# Patient Record
Sex: FEMALE | Race: WHITE | NOT HISPANIC OR LATINO | Employment: PART TIME | URBAN - METROPOLITAN AREA
[De-identification: names, ages, dates, MRNs, and addresses within clinical notes are randomized per-mention and may not be internally consistent; named-entity substitution may affect disease eponyms.]

---

## 2023-09-29 ENCOUNTER — HOSPITAL ENCOUNTER (OUTPATIENT)
Dept: DATA CONVERSION | Facility: HOSPITAL | Age: 23
Discharge: HOME | End: 2023-09-29
Payer: COMMERCIAL

## 2023-09-29 DIAGNOSIS — E55.9 VITAMIN D DEFICIENCY, UNSPECIFIED: ICD-10-CM

## 2023-09-29 DIAGNOSIS — Z00.00 ENCOUNTER FOR GENERAL ADULT MEDICAL EXAMINATION WITHOUT ABNORMAL FINDINGS: ICD-10-CM

## 2023-09-29 DIAGNOSIS — Z01.83 ENCOUNTER FOR BLOOD TYPING: ICD-10-CM

## 2023-09-29 DIAGNOSIS — K62.5 HEMORRHAGE OF ANUS AND RECTUM: ICD-10-CM

## 2023-09-29 LAB
25(OH)D3 SERPL-MCNC: 25 NG/ML (ref 31–100)
ABO + RH BLD: NORMAL
BLD GP AB SCN SERPL QL: NEGATIVE
HX OF BLOOD TRANSFUSION: NORMAL
TSH SERPL DL<=0.05 MIU/L-ACNC: 3.62 MIU/L (ref 0.27–4.2)

## 2023-11-02 PROBLEM — R53.82 CHRONIC FATIGUE: Status: ACTIVE | Noted: 2023-11-02

## 2023-11-02 PROBLEM — F41.1 ANXIETY, GENERALIZED: Status: ACTIVE | Noted: 2023-11-02

## 2023-11-02 PROBLEM — Z86.2 HISTORY OF IRON DEFICIENCY ANEMIA: Status: ACTIVE | Noted: 2023-11-02

## 2023-11-02 PROBLEM — E55.9 VITAMIN D DEFICIENCY: Status: ACTIVE | Noted: 2023-11-02

## 2023-11-02 PROBLEM — Z91.018 NUT ALLERGY: Status: ACTIVE | Noted: 2023-11-02

## 2023-11-02 PROBLEM — Z91.010 FOOD ALLERGY, PEANUT: Status: ACTIVE | Noted: 2023-11-02

## 2023-11-02 RX ORDER — DULOXETIN HYDROCHLORIDE 60 MG/1
CAPSULE, DELAYED RELEASE ORAL
COMMUNITY

## 2023-11-02 RX ORDER — PREDNISONE 20 MG/1
TABLET ORAL
COMMUNITY

## 2023-11-02 RX ORDER — DULOXETINE 40 MG/1
CAPSULE, DELAYED RELEASE ORAL
COMMUNITY

## 2023-11-02 RX ORDER — DULOXETIN HYDROCHLORIDE 20 MG/1
CAPSULE, DELAYED RELEASE ORAL
COMMUNITY

## 2023-11-02 RX ORDER — DIPHENHYDRAMINE HCL 25 MG
CAPSULE ORAL
COMMUNITY

## 2023-11-02 RX ORDER — ASPIRIN 325 MG
TABLET, DELAYED RELEASE (ENTERIC COATED) ORAL
COMMUNITY
Start: 2022-05-09

## 2023-11-02 RX ORDER — EPINEPHRINE 0.3 MG/.3ML
INJECTION SUBCUTANEOUS
COMMUNITY
Start: 2022-05-03 | End: 2023-12-19 | Stop reason: SDUPTHER

## 2023-11-02 RX ORDER — CHOLECALCIFEROL (VITAMIN D3) 125 MCG
CAPSULE ORAL
COMMUNITY
Start: 2023-09-29

## 2023-11-03 ENCOUNTER — CLINICAL SUPPORT (OUTPATIENT)
Dept: PRIMARY CARE | Facility: CLINIC | Age: 23
End: 2023-11-03
Payer: COMMERCIAL

## 2023-11-03 DIAGNOSIS — Z23 ENCOUNTER FOR IMMUNIZATION: Primary | ICD-10-CM

## 2023-11-03 PROCEDURE — 90651 9VHPV VACCINE 2/3 DOSE IM: CPT | Performed by: FAMILY MEDICINE

## 2023-12-19 ENCOUNTER — TELEPHONE (OUTPATIENT)
Dept: PRIMARY CARE | Facility: CLINIC | Age: 23
End: 2023-12-19
Payer: COMMERCIAL

## 2023-12-19 DIAGNOSIS — Z91.010 FOOD ALLERGY, PEANUT: ICD-10-CM

## 2023-12-19 RX ORDER — EPINEPHRINE 0.3 MG/.3ML
1 INJECTION, SOLUTION INTRAMUSCULAR AS NEEDED
Qty: 1 EACH | Refills: 5 | Status: SHIPPED | OUTPATIENT
Start: 2023-12-19 | End: 2024-12-18

## 2023-12-19 RX ORDER — EPINEPHRINE 0.3 MG/.3ML
INJECTION SUBCUTANEOUS
Qty: 2 EACH | Refills: 1 | Status: SHIPPED | OUTPATIENT
Start: 2023-12-19

## 2023-12-19 RX ORDER — EPINEPHRINE 0.15 MG/.15ML
0.3 INJECTION SUBCUTANEOUS ONCE
Status: CANCELLED | OUTPATIENT
Start: 2023-12-19 | End: 2023-12-19

## 2023-12-19 NOTE — TELEPHONE ENCOUNTER
Refill sent.  I sent her generic EpiPen as well as Auvi-Q in case 1 is better covered than the other.  Please let her know

## 2023-12-19 NOTE — TELEPHONE ENCOUNTER
Patient called in for EPI pen refill 0.3 mg. Wants this sent to [facility] [address] in [facility]. 9/29/23

## 2024-03-29 ENCOUNTER — CLINICAL SUPPORT (OUTPATIENT)
Dept: PRIMARY CARE | Facility: CLINIC | Age: 24
End: 2024-03-29
Payer: COMMERCIAL

## 2024-03-29 DIAGNOSIS — Z23 ENCOUNTER FOR IMMUNIZATION: ICD-10-CM

## 2024-03-29 PROCEDURE — 90471 IMMUNIZATION ADMIN: CPT | Performed by: NURSE PRACTITIONER

## 2025-06-10 ENCOUNTER — APPOINTMENT (OUTPATIENT)
Dept: INFECTIOUS DISEASES | Facility: CLINIC | Age: 25
End: 2025-06-10
Payer: COMMERCIAL

## 2025-06-10 DIAGNOSIS — K59.00 CONSTIPATION, UNSPECIFIED CONSTIPATION TYPE: ICD-10-CM

## 2025-06-10 DIAGNOSIS — Z71.84 COUNSELING FOR TRAVEL: Primary | ICD-10-CM

## 2025-06-10 PROCEDURE — 99202U03 TRAVEL CONSULT (U03): Performed by: STUDENT IN AN ORGANIZED HEALTH CARE EDUCATION/TRAINING PROGRAM

## 2025-06-10 PROCEDURE — 90471U01 YELLOW FEVER VACCINE SQ: Performed by: STUDENT IN AN ORGANIZED HEALTH CARE EDUCATION/TRAINING PROGRAM

## 2025-06-10 PROCEDURE — 90717 YELLOW FEVER VACCINE SUBQ: CPT | Performed by: STUDENT IN AN ORGANIZED HEALTH CARE EDUCATION/TRAINING PROGRAM

## 2025-06-10 RX ORDER — CIPROFLOXACIN 500 MG/1
500 TABLET, FILM COATED ORAL 2 TIMES DAILY
Qty: 6 TABLET | Refills: 0 | Status: SHIPPED | OUTPATIENT
Start: 2025-06-10 | End: 2025-06-13

## 2025-06-10 RX ORDER — DOCUSATE SODIUM 100 MG/1
100 CAPSULE, LIQUID FILLED ORAL 2 TIMES DAILY
Qty: 20 CAPSULE | Refills: 0 | Status: SHIPPED | OUTPATIENT
Start: 2025-06-10 | End: 2025-06-20

## 2025-06-10 RX ORDER — ATOVAQUONE AND PROGUANIL HYDROCHLORIDE 250; 100 MG/1; MG/1
1 TABLET, FILM COATED ORAL DAILY
Qty: 18 TABLET | Refills: 0 | Status: SHIPPED | OUTPATIENT
Start: 2025-06-10 | End: 2025-06-28

## 2025-06-10 RX ORDER — EPINEPHRINE 0.3 MG/.3ML
1 INJECTION INTRAMUSCULAR ONCE AS NEEDED
Qty: 1 EACH | Refills: 0 | Status: SHIPPED | OUTPATIENT
Start: 2025-06-10

## 2025-06-10 ASSESSMENT — PATIENT HEALTH QUESTIONNAIRE - PHQ9
SUM OF ALL RESPONSES TO PHQ9 QUESTIONS 1 AND 2: 2
2. FEELING DOWN, DEPRESSED OR HOPELESS: SEVERAL DAYS
1. LITTLE INTEREST OR PLEASURE IN DOING THINGS: SEVERAL DAYS

## 2025-06-10 NOTE — PROGRESS NOTES
Traveling to [place] from 6/18-6/26/25.  [place]  HepA 8/2011, 6/2012. Tap 8/2022. Oral Typhoid in 2022.    Ordered YF vaccine.      Prescribed Malarone (18 doses) and cipro for Traveler's diarrhea.  At patient's request because she tends to become constipated while traveling, prescribed docusate sodium.  Also, pt requested Epi-pen prescription.    Discussed food, water, and insect precautions.

## 2025-06-10 NOTE — PATIENT INSTRUCTIONS
You were seen today for your upcoming trip.    For your country specific issues, please refer back to the [employer] handouts supplied to you in the travel brochure folder that we provided to you at your visit.  These are updated daily, if necessary, by the reporting agencies, so are a valuable source of information for your trip.    This brief summary may not contain all of the items that we discussed today.  Please review the handouts given to you as well.    ** Please be sure to carry any medications with you in your carry-on luggage in the event that your luggage is delayed or lost during your travels.    ** For your trip, as we discussed, standard food and water precautions apply.     You should avoid drinking any water that is not bottled.  Alcoholic or carbonated beverages are safe to drink.  Any beverage that has been brought to a rolling boil for  3 minutes is also safe to drink (once it has cooled some).  Commercially purchased milk products that are boxed (may be on store shelves) or refrigerated, are pasteurized and therefore safe to drink as long as they are refrigerated after opening.  Juices that are prepared commercially (in boxes or individual bottles) are also safe to drink as they are pasteurized as well.  Avoid road-side juice vendors as the juice may be diluted with contaminated water or produced from unclean fruit.  Regarding food precautions, you want to make sure that meats are well cooked.   Avoid eating fresh fruits and vegetables raw with the skin intact.  Peel before eating.  Avoid salads due to possible contamination by contaminated water.  Avoid any unpasteurized cheeses (they typically are very white in appearance)    ** We recommend that you use insect repellant to help you prevent infections caused by biting insects such as mosquitoes, flies, ticks, fleas and chiggers.  This includes protection against Zika virus, Dengue virus, Chikungunya and Malaria, just to name a few.    For insect  "repellents that are applied directly to the skin, we recommend DEET containing repellants with a percentage between 20-40%.  Apply to skin exposed surfaces only, every 6-8 hours throughout the day.  I typically recommend applying before breakfast, lunch and dinner as these are natural breaks in your day.  Wash your hands after applying. Apply again in the evening.  You must reapply after bathing or swimming as it is washed away with water.  Apply after sunscreen products are applied. DEET containing repellants protect against all concerning insects with the exception of hornets, wasps or bees. Activity against ticks only lasts for 2 hours. For additional Tick precautions while hiking, tuck your pant legs into your socks as this will prevent ticks from crawling up your shoes / socks onto your legs while walking. Perform a \"tick check\" at least once daily, at the end of your day.  Check in the sporting goods areas of most stores for these products.  A recommended alternative, Picardin ,may also be used every 8 hours.  If you are unable to locate the product in stores, try on-line stores as well.      PERMETHRIN SPRAY  is an additional option and is for application to clothing and garments only.  This can be applied to hats, bandanas, socks, boots and any clothing items to prevent insect attention.  I can be applied before you leave and will remain active through many washes and for up to 6 weeks in unwashed clothing.  Read any packaging for full specifications. Apply in an open area as when wet, it has an odor. Once dry, it typically has no odor and does not stain clothing. Regular repellants should be applied to exposed skin however.  Permethrin may only be available on-line.     **  As a general safety procedure, we recommend that you scan a copy of your passport, any travel required documents (yellow fever vaccine card), and itinerary and email them to yourself.  Keep these in a special travel folder for reference " in the event that your travel documents are misplaced or stolen while abroad.  You should also leave a detailed copy of your itinerary with a friend or relative at home for reference as well.  If traveling for long periods, an international [employer] card or global plan for your cellular service may be beneficial for communication. This list is not meant to be all inclusive.      **  Please review and understand any cultural aspects of the countries that you will be visiting to ensure that appropriate dress and behaviors are understood.  ENJOY YOUR TRIP (o:      **  Make sure to come back to complete any vaccine series that may have been started today.  This will ensure full vaccine efficacy and protection for future travel.    Today, you received the Yellow Fever vaccine, which is good for life. I sent prescriptions for malaria prevention medicine, for ciprofloxacin for Traveler's diarrhea, medicine for constipation, and epipens to your pharmacy.  Have  a great trip!

## 2025-07-22 ENCOUNTER — OFFICE VISIT (OUTPATIENT)
Dept: INFECTIOUS DISEASES | Facility: CLINIC | Age: 25
End: 2025-07-22
Payer: COMMERCIAL

## 2025-07-22 VITALS
BODY MASS INDEX: 17.14 KG/M2 | SYSTOLIC BLOOD PRESSURE: 117 MMHG | TEMPERATURE: 98.4 F | WEIGHT: 103 LBS | HEART RATE: 111 BPM | DIASTOLIC BLOOD PRESSURE: 71 MMHG

## 2025-07-22 DIAGNOSIS — R69 TRAVEL-RELATED ILLNESS: Primary | ICD-10-CM

## 2025-07-22 PROCEDURE — 99214 OFFICE O/P EST MOD 30 MIN: CPT | Performed by: STUDENT IN AN ORGANIZED HEALTH CARE EDUCATION/TRAINING PROGRAM

## 2025-07-22 RX ORDER — METRONIDAZOLE 500 MG/1
500 TABLET ORAL EVERY 12 HOURS
Qty: 14 TABLET | Refills: 0 | Status: SHIPPED | OUTPATIENT
Start: 2025-07-22 | End: 2025-07-29

## 2025-07-22 NOTE — PATIENT INSTRUCTIONS
Today, I ordered some blood work. I also ordered some stool tests.  As soon as you can, bring in a stool sample to the lab.  If you have any questions or concerns, please give me a call at [phone].

## 2025-07-22 NOTE — LETTER
07/25/25    Myles Alonzo DO  [address]  [facility] [address]      Dear Dr. Myles Alonzo DO,    I am writing to confirm that your patient, Lilian Stiles, received care in my office on 07/25/25. I have enclosed a summary of the care provided to Lilian for your reference.    Please contact me with any questions you may have regarding the visit.    Sincerely,         Neeta Arriaga PA-C  [address]  [facility] [address]  [facility] [id number]    CC: No Recipients

## 2025-07-25 ASSESSMENT — ENCOUNTER SYMPTOMS
ARTHRALGIAS: 0
NAUSEA: 0
DIARRHEA: 1
JOINT SWELLING: 0
VOMITING: 0
FEVER: 0
MYALGIAS: 0
CHILLS: 0
BLOOD IN STOOL: 0
ABDOMINAL PAIN: 0

## 2025-07-25 NOTE — PROGRESS NOTES
Infectious Diseases Clinic Consult Note:    Referred by   Primary MD: Myles Alonzo DO  Reason for Consult: Post-travel diarrhea    History of Current Issue  Lilian Stiles is a 24 y.o. year old female who traveled to [place] from 6/18-6/26/25 and now presents to the ID clinic with post-travel diarrhea.  Pt reports that while in [place] she started to have some abdominal discomfort and diarrhea.  While in [place], [place]. She took cipro for two days and then was feeling better. However, diarrhea has persisted since she returned from her trip to [place]. Initially, she was having four bowel movements per day. She is currently having two bowel movements a day, but this is unusual for her. She also reports the bowel movements to be very soft, with little form,  and an orange-light brown color. She denies fever, chills, joint pain, nausea, bloating, hematochezia, melena.           PAST MEDICAL HISTORY:  Medical History[1]  PAST SURGICAL HISTORY:  Surgical History[2]  ALLERGIES:    Allergies[3]    MEDICATIONS:    Current Medications[4]    FAMILY HISTORY:   Family History[5]     SOCIAL HISTORY:       Marital Status:  Tobacco / Smokeless tobacco/ Vaping:   reports that she has never smoked. She has never been exposed to tobacco smoke. She has never used smokeless tobacco.   Alcohol:   Social History     Substance and Sexual Activity   Alcohol Use Not on file      Drug Use:    Social History     Substance and Sexual Activity   Drug Use Not on file      IMMUNIZATIONS:    Immunization History   Administered Date(s) Administered    COVID-19, mRNA, LNP-S, PF, 30 mcg/0.3 mL dose 05/22/2021, 06/11/2021    DTaP vaccine, pediatric  (INFANRIX) 03/30/2000, 06/07/2000, 01/31/2001, 03/06/2001, 07/23/2005    Flu vaccine (IIV4), preservative free *Check age/dose* 09/29/2023    HPV 9-valent vaccine (GARDASIL 9) 09/29/2023, 11/03/2023, 03/29/2024    Hepatitis A vaccine, pediatric/adolescent (HAVRIX, VAQTA)  08/19/2011, 06/14/2012    Hepatitis B vaccine, 19 yrs and under (RECOMBIVAX, ENGERIX) 01/05/2000, 03/30/2000, 09/11/2000    HiB PRP-OMP conjugate vaccine, pediatric (PEDVAXHIB) 01/03/2000, 03/30/2000, 06/07/2000    British Encephalitis IM 08/02/2022    MMR vaccine, subcutaneous (MMR II) 03/06/2001, 07/23/2005    Meningococcal ACWY-D (Menactra) 4-valent conjugate vaccine 06/14/2012, 08/17/2017    Pfizer Purple Cap SARS-CoV-2 01/13/2022    Pneumococcal Conjugate PCV 7 06/06/2001, 06/14/2012    Poliovirus vaccine, subcutaneous (IPOL) 01/31/2000, 03/30/2000, 06/06/2001, 07/23/2005    Tdap vaccine, age 7 year and older (BOOSTRIX, ADACEL) 06/14/2012, 08/02/2022    Varicella vaccine, subcutaneous (VARIVAX) 12/06/2000, 08/17/2009    Yellow Fever 06/10/2025       REVIEW OF SYSTEMS:  Review of Systems   Constitutional:  Negative for chills and fever.   Gastrointestinal:  Positive for diarrhea. Negative for abdominal pain, blood in stool, nausea and vomiting.   Musculoskeletal:  Negative for arthralgias, joint swelling and myalgias.         PHYSICAL EXAMINATION:       Visit Vitals  /71 (BP Location: Left arm, Patient Position: Sitting)   Pulse (!) 111   Temp 36.9 °C (98.4 °F)   Wt 46.7 kg (103 lb)   BMI 17.14 kg/m²   Smoking Status Never   BSA 1.46 m²        EXAM:   Constitutional: alert and oriented  Cardio: extremities warm and well-perfused  Pulm: normal work of breathing  Abdomen: soft, non-distended, no tenderness  Psyc: pleasant mood and affect    DATA:    Laboratory Values and Test Results: none    Microbiology: none  Imaging Studies:  none    ASSESSMENT / RECOMMENDATIONS:  Lilian Stiles is a 24 y.o. year old female who traveled to [place] from 6/18-6/26/25 and now presents to the ID clinic with post-travel diarrhea.  As patient has now had diarrhea for more than three weeks since returning, feel this is more likely to be a protozoal infection such as Giardia (as opposed to bacterial infection). Will treat  empirically for Giardia with metronidazole 500mg PO BID x 7 days.  Also, ordered CBC, CMP, ova and parasite stool study with Giardia and Cryptosporidium antigen, and stool pathogen panel. Once results are received, will call patient to discuss results.        I spent 30 minutes in the professional and overall care of this patient.      Neeta Arriaga PA-C                         [1] No past medical history on file.  [2] No past surgical history on file.  [3]   Allergies  Allergen Reactions    Peanut Hives and Other     Sneezing; throat itching    Pistachio Nut Other     throat itching    Pollen Extracts Itching   [4]   Current Outpatient Medications:     Auvi-Q 0.3 mg/0.3 mL injection syringe, Inject 0.3 mL (0.3 mg) into the muscle if needed for anaphylaxis. Inject into upper leg. Call 911 after use., Disp: 1 each, Rfl: 5    diphenhydrAMINE (BENADryl) 25 mg capsule, 1 capsule oral every six hours PRN itching, Disp: , Rfl:     DULoxetine 40 mg DR capsule, 1 capsule Orally Once a day (Patient taking differently: Take 90 mg by mouth once daily.), Disp: , Rfl:     EPINEPHrine (EpiPen 2-Gregg) 0.3 mg/0.3 mL injection syringe, Inject 0.3 mL (0.3 mg) into the muscle 1 time if needed for anaphylaxis for up to 1 dose. Inject into upper leg. Call 911 after use., Disp: 1 each, Rfl: 0    EPINEPHrine 0.3 mg/0.3 mL injection syringe, as directed Injection as directed for 60 days, Disp: 2 each, Rfl: 1    cholecalciferol (Vitamin D-3) 125 MCG (5000 UT) capsule, 1 capsule Orally Once a day for 90 days, Disp: , Rfl:     cholecalciferol (Vitamin D-3) 50,000 unit capsule, 1 capsule Orally once a week for 12 weeks for 90 days, Disp: , Rfl:     DULoxetine (Cymbalta) 20 mg DR capsule, 1 capsule Orally ONCE A DAY, Disp: , Rfl:     DULoxetine (Cymbalta) 60 mg DR capsule, 1 capsule Orally Once a day (Patient not taking: Reported on 7/22/2025), Disp: , Rfl:     metroNIDAZOLE (Flagyl) 500 mg tablet, Take 1 tablet (500 mg) by mouth every 12  hours for 7 days., Disp: 14 tablet, Rfl: 0    predniSONE (Deltasone) 20 mg tablet, 1 tablet oral twice a day with or after meal (Patient not taking: Reported on 7/22/2025), Disp: , Rfl:   [5]   Family History  Problem Relation Name Age of Onset    Diabetes Father      Stroke Maternal Grandmother      Heart disease Maternal Grandfather      Other (GI cancer) Paternal Grandmother      Diabetes Paternal Grandmother      Cancer Paternal Grandmother      Breast cancer Other AUNT       89

## 2025-07-27 LAB
ALBUMIN SERPL-MCNC: 4.7 G/DL (ref 3.6–5.1)
ALP SERPL-CCNC: 40 U/L (ref 31–125)
ALT SERPL-CCNC: 11 U/L (ref 6–29)
ANION GAP SERPL CALCULATED.4IONS-SCNC: 9 MMOL/L (CALC) (ref 7–17)
AST SERPL-CCNC: 14 U/L (ref 10–30)
BASOPHILS # BLD AUTO: 41 CELLS/UL (ref 0–200)
BASOPHILS NFR BLD AUTO: 0.9 %
BILIRUB SERPL-MCNC: 0.5 MG/DL (ref 0.2–1.2)
BUN SERPL-MCNC: 11 MG/DL (ref 7–25)
C COLI+JEJUNI+LARI FUSA STL QL NAA+PROBE: NOT DETECTED
CALCIUM SERPL-MCNC: 9.3 MG/DL (ref 8.6–10.2)
CHLORIDE SERPL-SCNC: 104 MMOL/L (ref 98–110)
CO2 SERPL-SCNC: 25 MMOL/L (ref 20–32)
CREAT SERPL-MCNC: 0.77 MG/DL (ref 0.5–0.96)
EC STX1 GENE STL QL NAA+PROBE: NOT DETECTED
EC STX2 GENE STL QL NAA+PROBE: NOT DETECTED
EGFRCR SERPLBLD CKD-EPI 2021: 110 ML/MIN/1.73M2
EOSINOPHIL # BLD AUTO: 202 CELLS/UL (ref 15–500)
EOSINOPHIL NFR BLD AUTO: 4.4 %
ERYTHROCYTE [DISTWIDTH] IN BLOOD BY AUTOMATED COUNT: 14.3 % (ref 11–15)
GLUCOSE SERPL-MCNC: 79 MG/DL (ref 65–99)
HCT VFR BLD AUTO: 37 % (ref 35–45)
HGB BLD-MCNC: 11.5 G/DL (ref 11.7–15.5)
LYMPHOCYTES # BLD AUTO: 1762 CELLS/UL (ref 850–3900)
LYMPHOCYTES NFR BLD AUTO: 38.3 %
MCH RBC QN AUTO: 27.6 PG (ref 27–33)
MCHC RBC AUTO-ENTMCNC: 31.1 G/DL (ref 32–36)
MCV RBC AUTO: 88.9 FL (ref 80–100)
MONOCYTES # BLD AUTO: 483 CELLS/UL (ref 200–950)
MONOCYTES NFR BLD AUTO: 10.5 %
NEUTROPHILS # BLD AUTO: 2111 CELLS/UL (ref 1500–7800)
NEUTROPHILS NFR BLD AUTO: 45.9 %
NOROV GI+II ORF1-ORF2 JNC STL QL NAA+PR: NOT DETECTED
PLATELET # BLD AUTO: 336 THOUSAND/UL (ref 140–400)
PMV BLD REES-ECKER: 9.9 FL (ref 7.5–12.5)
POTASSIUM SERPL-SCNC: 4.5 MMOL/L (ref 3.5–5.3)
PROT SERPL-MCNC: 7.3 G/DL (ref 6.1–8.1)
RBC # BLD AUTO: 4.16 MILLION/UL (ref 3.8–5.1)
RVA NSP5 STL QL NAA+PROBE: NOT DETECTED
SALMONELLA SP RPOD STL QL NAA+PROBE: NOT DETECTED
SHIGELLA DNA SPEC QL NAA+PROBE: NOT DETECTED
SODIUM SERPL-SCNC: 138 MMOL/L (ref 135–146)
V CHOL+PARA RFBL+TRKH+TNAA STL QL NAA+PR: NOT DETECTED
WBC # BLD AUTO: 4.6 THOUSAND/UL (ref 3.8–10.8)
Y ENTERO RECN STL QL NAA+PROBE: NOT DETECTED

## 2025-07-28 LAB
CRYPTOSP AG STL QL IA: NORMAL
G LAMBLIA AG STL QL IA: NORMAL
O+P STL CONC: NORMAL
O+P STL TRI STN: NORMAL

## 2025-07-29 DIAGNOSIS — R69 TRAVEL-RELATED ILLNESS: ICD-10-CM

## 2025-08-12 LAB
CRYPTOSP AG STL QL IA: NORMAL
G LAMBLIA AG STL QL IA: NORMAL
O+P STL TRI STN: NORMAL